# Patient Record
Sex: FEMALE | Race: BLACK OR AFRICAN AMERICAN | Employment: FULL TIME | URBAN - METROPOLITAN AREA
[De-identification: names, ages, dates, MRNs, and addresses within clinical notes are randomized per-mention and may not be internally consistent; named-entity substitution may affect disease eponyms.]

---

## 2022-02-24 ENCOUNTER — APPOINTMENT (OUTPATIENT)
Dept: GENERAL RADIOLOGY | Age: 42
End: 2022-02-24
Attending: EMERGENCY MEDICINE
Payer: COMMERCIAL

## 2022-02-24 ENCOUNTER — HOSPITAL ENCOUNTER (EMERGENCY)
Age: 42
Discharge: HOME | End: 2022-02-24
Attending: EMERGENCY MEDICINE
Payer: COMMERCIAL

## 2022-02-24 VITALS
RESPIRATION RATE: 16 BRPM | SYSTOLIC BLOOD PRESSURE: 139 MMHG | TEMPERATURE: 98.5 F | HEART RATE: 76 BPM | OXYGEN SATURATION: 97 % | DIASTOLIC BLOOD PRESSURE: 81 MMHG

## 2022-02-24 DIAGNOSIS — M25.561 ACUTE PAIN OF RIGHT KNEE: Primary | ICD-10-CM

## 2022-02-24 DIAGNOSIS — M17.10 ARTHRITIS OF KNEE: ICD-10-CM

## 2022-02-24 PROCEDURE — 74011250636 HC RX REV CODE- 250/636: Performed by: EMERGENCY MEDICINE

## 2022-02-24 PROCEDURE — 74011250637 HC RX REV CODE- 250/637: Performed by: EMERGENCY MEDICINE

## 2022-02-24 PROCEDURE — 99283 EMERGENCY DEPT VISIT LOW MDM: CPT

## 2022-02-24 PROCEDURE — 96372 THER/PROPH/DIAG INJ SC/IM: CPT

## 2022-02-24 PROCEDURE — 73562 X-RAY EXAM OF KNEE 3: CPT

## 2022-02-24 RX ORDER — HYDROCODONE BITARTRATE AND ACETAMINOPHEN 5; 325 MG/1; MG/1
1 TABLET ORAL
Status: COMPLETED | OUTPATIENT
Start: 2022-02-24 | End: 2022-02-24

## 2022-02-24 RX ORDER — DICLOFENAC SODIUM 10 MG/G
2 GEL TOPICAL 4 TIMES DAILY
Qty: 100 G | Refills: 0 | Status: SHIPPED | OUTPATIENT
Start: 2022-02-24

## 2022-02-24 RX ORDER — KETOROLAC TROMETHAMINE 30 MG/ML
30 INJECTION, SOLUTION INTRAMUSCULAR; INTRAVENOUS
Status: COMPLETED | OUTPATIENT
Start: 2022-02-24 | End: 2022-02-24

## 2022-02-24 RX ADMIN — KETOROLAC TROMETHAMINE 30 MG: 30 INJECTION, SOLUTION INTRAMUSCULAR; INTRAVENOUS at 15:11

## 2022-02-24 RX ADMIN — HYDROCODONE BITARTRATE AND ACETAMINOPHEN 1 TABLET: 5; 325 TABLET ORAL at 15:10

## 2022-02-24 NOTE — ED PROVIDER NOTES
Please note that this dictation was completed with [employer], the computer voice recognition software.  Quite often unanticipated grammatical, syntax, homophones, and other interpretive errors are inadvertently transcribed by the computer software.  Please disregard these errors.  Please excuse any errors that have escaped final proofreading. Patient is a 73-year-old female presenting to ED for evaluation of right-sided knee pain. She has been feeling his pain for \"a while\" but feels like it is worsened over the past 1 to 2 weeks. Denies any known injury, but states that she has been doing physical therapy recently in preparation for an upcoming bariatric surgery. She was seen by her PCP for this this week and was started on anti-inflammatories and prednisone and told to follow-up with orthopedic surgery. She has not scheduled an orthopedic appointment yet. Denies fever, chills, skin changes, or any other medical complaints at this time. No past medical history on file. No past surgical history on file. No family history on file. Social History     Socioeconomic History    Marital status: SINGLE     Spouse name: Not on file    Number of children: Not on file    Years of education: Not on file    Highest education level: Not on file   Occupational History    Not on file   Tobacco Use    Smoking status: Not on file    Smokeless tobacco: Not on file   Substance and Sexual Activity    Alcohol use: Not on file    Drug use: Not on file    Sexual activity: Not on file   Other Topics Concern    Not on file   Social History Narrative    Not on file     Social Determinants of Health     Financial Resource Strain:     Difficulty of Paying Living Expenses: Not on file   Food Insecurity:     Worried About Running Out of Food in the Last Year: Not on file    Peace of Food in the Last Year: Not on file   Transportation Needs:     Lack of Transportation (Medical):  Not on file    Lack of Transportation (Non-Medical): Not on file   Physical Activity:     Days of Exercise per Week: Not on file    Minutes of Exercise per Session: Not on file   Stress:     Feeling of Stress : Not on file   Social Connections:     Frequency of Communication with Friends and Family: Not on file    Frequency of Social Gatherings with Friends and Family: Not on file    Attends Holiness Services: Not on file    Active Member of [address] [place] [address] or Organizations: Not on file    Attends Club or Organization Meetings: Not on file    Marital Status: Not on file   Intimate Partner Violence:     Fear of Current or Ex-Partner: Not on file    Emotionally Abused: Not on file    Physically Abused: Not on file    Sexually Abused: Not on file   Housing Stability:     Unable to Pay for Housing in the Last Year: Not on file    Number of [place] in the Last Year: Not on file    Unstable Housing in the Last Year: Not on file         ALLERGIES: Patient has no known allergies. Review of Systems   Constitutional: Negative for chills and fever. HENT: Negative for ear pain and sore throat. Eyes: Negative for visual disturbance. Respiratory: Negative for cough and shortness of breath. Cardiovascular: Negative for chest pain. Gastrointestinal: Negative for abdominal pain, diarrhea, nausea and vomiting. Genitourinary: Negative for flank pain. Musculoskeletal: Positive for arthralgias. Negative for back pain. Skin: Negative for color change. Neurological: Negative for dizziness and headaches. Psychiatric/Behavioral: Negative for confusion. Vitals:    02/24/22 1335   BP: 139/81   Pulse: 76   Resp: 16   Temp: 98.5 °F (36.9 °C)   SpO2: 97%            Physical Exam  Vitals and nursing note reviewed. Constitutional:       General: She is not in acute distress. Appearance: Normal appearance. She is not ill-appearing. HENT:      Head: Normocephalic and atraumatic. Jaw: There is normal jaw occlusion. Eyes:      General: Vision grossly intact. Extraocular Movements: Extraocular movements intact. Conjunctiva/sclera: Conjunctivae normal.   Neck:      Trachea: Phonation normal.   Cardiovascular:      Rate and Rhythm: Normal rate and regular rhythm. Heart sounds: Normal heart sounds. Pulmonary:      Effort: Pulmonary effort is normal.      Breath sounds: Normal breath sounds and air entry. Abdominal:      Palpations: Abdomen is soft. Tenderness: There is no abdominal tenderness. Musculoskeletal:         General: Normal range of motion. Cervical back: Normal range of motion. Right upper leg: Normal.      Left upper leg: Normal.      Right knee: No deformity or erythema. Normal range of motion. Tenderness present. Right lower leg: Normal.   Skin:     General: Skin is warm and dry. Neurological:      General: No focal deficit present. Mental Status: She is alert and oriented to person, place, and time. Psychiatric:         Attention and Perception: Attention normal.         Mood and Affect: Mood normal.         Speech: Speech normal.          MDM  Number of Diagnoses or Management Options  Acute pain of right knee  Arthritis of knee  Diagnosis management comments: Patient is alert, afebrile, vitals stable. Presents with right knee pain, has been increasing physical therapy recently, no falls or blunt trauma. Seen by her PCP already for this and started on anti-inflammatories and prednisone taper. Good range of motion, no evidence of septic joint on exam.  X-ray shows a moderate joint effusion and arthritic changes, suspect likely worsened by her weight. Advised patient on findings, she will continue anti-inflammatories, gentle stretching, and I will refer her to orthopedics for further interventions as needed. Return precautions outlined. Questions answered at this time.     ED Course as of 02/24/22 1629   Thu Feb 24, 2022   1528 XR KNEE RT 3 V  FINDINGS: Three views of the right knee demonstrate no fracture or other acute  osseous or articular abnormality. There is a moderate-sized joint effusion. .  Degenerative changes also noted, most severe in the medial and patellofemoral  joint spaces, with articular sclerosis, joint space narrowing and hypertrophic  bone, slightly greater than expected for age.     IMPRESSION  Moderate-sized joint effusion. No acute bony abnormality. Degenerative change. . [EP]      ED Course User Index  [EP] HEATHER Parks     4:32 PM  Pt has been reevaluated. There are no new complaints, changes, or physical findings at this time. All results have been reviewed with patient and/or family. Medications have been reviewed w/ pt and/or family. Pt and/or family's questions have been answered. Pt and/or family expressed good understanding of the dx/tx/rx and is in agreement with plan of care. Pt instructed and agreed to f/u w/ orthopedics and to return to ED upon further deterioration. Pt is ready for discharge. IMPRESSION:  1. Acute pain of right knee    2. Arthritis of knee        PLAN:  1. Discharge Medication List as of 2/24/2022  3:44 PM        2.    Follow-up Information     Follow up With Specialties Details Why Contact Info    William Fofana MD ([facility]) Orthopedic Surgery Schedule an appointment as soon as possible for a visit   [address] [id number]      Monica Henderson MD Family Medicine Schedule an appointment as soon as possible for a visit   [address] [facility]  [phone]              Return to ED if worse     Procedures